# Patient Record
Sex: FEMALE | Race: WHITE | NOT HISPANIC OR LATINO | URBAN - NONMETROPOLITAN AREA
[De-identification: names, ages, dates, MRNs, and addresses within clinical notes are randomized per-mention and may not be internally consistent; named-entity substitution may affect disease eponyms.]

---

## 2020-10-01 ENCOUNTER — APPOINTMENT (RX ONLY)
Dept: URBAN - NONMETROPOLITAN AREA CLINIC 16 | Facility: CLINIC | Age: 65
Setting detail: DERMATOLOGY
End: 2020-10-01

## 2020-10-01 VITALS — HEIGHT: 61 IN | WEIGHT: 180 LBS

## 2020-10-01 DIAGNOSIS — L30.9 DERMATITIS, UNSPECIFIED: ICD-10-CM | Status: INADEQUATELY CONTROLLED

## 2020-10-01 PROCEDURE — 11900 INJECT SKIN LESIONS </W 7: CPT

## 2020-10-01 PROCEDURE — ? COUNSELING

## 2020-10-01 PROCEDURE — ? INJECTION

## 2020-10-01 PROCEDURE — ? FULL BODY SKIN EXAM

## 2020-10-01 PROCEDURE — ? PRESCRIPTION

## 2020-10-01 PROCEDURE — 11104 PUNCH BX SKIN SINGLE LESION: CPT

## 2020-10-01 PROCEDURE — ? OTHER

## 2020-10-01 PROCEDURE — ? BIOPSY BY PUNCH METHOD

## 2020-10-01 RX ORDER — TRIAMCINOLONE ACETONIDE 1 MG/G
CREAM TOPICAL
Qty: 1 | Refills: 3 | Status: ERX | COMMUNITY
Start: 2020-10-01

## 2020-10-01 RX ADMIN — TRIAMCINOLONE ACETONIDE: 1 CREAM TOPICAL at 00:00

## 2020-10-01 ASSESSMENT — LOCATION DETAILED DESCRIPTION DERM
LOCATION DETAILED: LEFT MEDIAL UPPER BACK
LOCATION DETAILED: RIGHT BUTTOCK
LOCATION DETAILED: LEFT LATERAL ABDOMEN

## 2020-10-01 ASSESSMENT — LOCATION SIMPLE DESCRIPTION DERM
LOCATION SIMPLE: LEFT UPPER BACK
LOCATION SIMPLE: ABDOMEN
LOCATION SIMPLE: RIGHT BUTTOCK

## 2020-10-01 ASSESSMENT — LOCATION ZONE DERM: LOCATION ZONE: TRUNK

## 2020-10-01 ASSESSMENT — BSA RASH: BSA RASH: 30

## 2020-10-01 ASSESSMENT — SEVERITY ASSESSMENT: SEVERITY: MODERATE TO SEVERE

## 2020-10-01 NOTE — PROCEDURE: OTHER
Other (Free Text): Pt states that she has a history of psoriasis. This current dermatitis began roughly 2 weeks ago when she changed her laundry detergent. She was seen in the ER and given a 5 day dose of prednisone. She states that it did help but that the dermatitis is now returning. Punch bx was performed today. We will call with bx results and if needed new tx regimen.
Detail Level: Zone
Note Text (......Xxx Chief Complaint.): This diagnosis correlates with the

## 2020-10-01 NOTE — HPI: RASH (ATOPIC DERMATITIS)
How Severe Is Your Atopic Dermatitis?: moderate
Is This A New Presentation, Or A Follow-Up?: Rash
Additional History: Pt was seen in ER for this. It started after she used new laundry detergent

## 2020-10-12 ENCOUNTER — RX ONLY (OUTPATIENT)
Age: 65
Setting detail: RX ONLY
End: 2020-10-12

## 2020-10-12 RX ORDER — CLOBETASOL PROPIONATE 0.5 MG/G
CREAM TOPICAL
Qty: 1 | Refills: 2 | Status: ERX | COMMUNITY
Start: 2020-10-12

## 2020-10-12 RX ORDER — FLUOCINOLONE ACETONIDE 0.1 MG/G
CREAM TOPICAL
Qty: 1 | Refills: 2 | Status: ERX | COMMUNITY
Start: 2020-10-12

## 2021-10-02 NOTE — PROCEDURE: INJECTION
Route: [place] Wound Check  ImageIf you have a wound, it may take some time to heal. Eventually, a scar will form. The scar will also fade with time. It is important to take care of your wound while it is healing. This helps to protect your wound from infection.    How should I take care of my wound at home?  Some wounds are allowed to close on their own or are repaired at a later date. There are many different ways to close and cover a wound, including stitches (sutures), skin glue, and adhesive strips. Follow your health care provider's instructions about:    Wound care.  Bandage (dressing) changes and removal.  Wound closure removal.    Take medicines only as directed by your health care provider.  Keep all follow-up visits as directed by your health care provider. This is important.  Do not take baths, swim, or use a hot tub until your health care provider approves. You may shower as directed by your health care provider.  Keep your wound clean and dry.  What affects scar formation?  Scars affect each person differently. How your body scars depends on:    The location and size of your wound.  Traits that you inherited from your parents (genetic predisposition).  How you take care of your wound. Irritation and inflammation increase the amount of scar formation.  Sun exposure. This can darken a scar.    When should I call or see my health care provider?  Call or see your health care provider if:    You have redness, swelling, or pain at your wound site.  You have fluid, blood, or pus coming from your wound.  You have muscle aches, chills, or a general ill feeling.  You notice a bad smell coming from the wound.  Your wound separates after the sutures, staples, or skin adhesive strips have been removed.  You have persistent nausea or vomiting.  You have a fever.  You are dizzy.    When should I call 911 or go to the emergency room?  Call 911 or go to the emergency room if:    You faint.  You have difficulty breathing.    This information is not intended to replace advice given to you by your health care provider. Make sure you discuss any questions you have with your health care provider.

## 2025-07-28 ENCOUNTER — APPOINTMENT (OUTPATIENT)
Dept: URBAN - NONMETROPOLITAN AREA CLINIC 16 | Facility: CLINIC | Age: 70
Setting detail: DERMATOLOGY
End: 2025-07-28

## 2025-07-28 VITALS — HEIGHT: 61 IN

## 2025-07-28 DIAGNOSIS — L30.9 DERMATITIS, UNSPECIFIED: ICD-10-CM | Status: INADEQUATELY CONTROLLED

## 2025-07-28 PROCEDURE — ? COUNSELING

## 2025-07-28 PROCEDURE — ? INJECTION

## 2025-07-28 PROCEDURE — ? PRESCRIPTION

## 2025-07-28 PROCEDURE — ? PRESCRIPTION MEDICATION MANAGEMENT

## 2025-07-28 PROCEDURE — ? FULL BODY SKIN EXAM - DECLINED

## 2025-07-28 RX ORDER — TRIAMCINOLONE ACETONIDE 1 MG/G
CREAM TOPICAL
Qty: 454 | Refills: 3 | COMMUNITY
Start: 2025-07-28

## 2025-07-28 RX ADMIN — TRIAMCINOLONE ACETONIDE: 1 CREAM TOPICAL at 00:00

## 2025-07-28 ASSESSMENT — BSA RASH: BSA RASH: 30

## 2025-07-28 ASSESSMENT — LOCATION SIMPLE DESCRIPTION DERM: LOCATION SIMPLE: LEFT BUTTOCK

## 2025-07-28 ASSESSMENT — LOCATION DETAILED DESCRIPTION DERM: LOCATION DETAILED: LEFT BUTTOCK

## 2025-07-28 ASSESSMENT — LOCATION ZONE DERM: LOCATION ZONE: TRUNK

## 2025-07-28 ASSESSMENT — ITCH NUMERIC RATING SCALE: HOW SEVERE IS YOUR ITCHING?: 10

## 2025-07-28 NOTE — PROCEDURE: INJECTION
Type Of Vial Used?: Multi-Dose
Hide Second Medication?: No
Dose Administered (Numbers Only - Mg, G, Mcg, Units, Cc): 40
Medication (1) And Associated J-Code Units: Triamcinolone acetonide, 10mg
Dose Administered (Numbers Only - Mg, G, Mcg, Units, Cc): 0
Consent: The risks of the medication were reviewed with the patient.
Render J-Code Information In Note?: yes
Procedure Information: Please note that the numeric value listed in the Medication (1) and associated J-code units and Medication (2) and associated J-code units variables are j-code amounts and do not represent either the concentration or the total amount of the medications injected.  I strongly recommend selecting no to the Render J-code information in note question. This will allow your note to be more clear. If you are billing j-codes with your injection codes you need to document the total amount of the medication injected. This amount should match the j-code units. For example, if you are injecting Triamcinolone 40mg as an intramuscular injection you would select 40 for the dose field.. This would allow you to document  with 4 units (40mg = 10mg x 4). The total volume is not used to calculate j-codes only the amount of the medication administered.
Treatment Number: 1
Post-Care Instructions: I reviewed with the patient in detail post-care instructions. Patient understands to keep the injection sites clean and call the clinic if there is any redness, swelling or pain.
Detail Level: None
Route: IM

## 2025-07-28 NOTE — PROCEDURE: PRESCRIPTION MEDICATION MANAGEMENT
Render In Strict Bullet Format?: No
Plan: Pt believes that this may be the same dermatitis that was bx'd in 2020. \\n\\Andrade that time, dermatitis resulted as either allergic contact or nummular derm. Pt states that since then her skin has remained clear until moving to [facility]. \\n\\nPt states that the prior derm was caused from and laundry detergent. Pt states that she does not have anyone that can wash her clothing and bedding sep for her. \\n\\nDiscussed options today of treating of contact reactioin with IM and creams or treating as Atopic derm with pills or injections. \\n\\nPt would like to trial IM and creams as this worked and cleared derm in the past. We will plan to recheck in 6 weeks. 
Initiate Treatment: triamcinolone cream qod prn 
Detail Level: Zone